# Patient Record
Sex: MALE | Race: WHITE | URBAN - METROPOLITAN AREA
[De-identification: names, ages, dates, MRNs, and addresses within clinical notes are randomized per-mention and may not be internally consistent; named-entity substitution may affect disease eponyms.]

---

## 2020-10-20 ENCOUNTER — OFFICE VISIT (OUTPATIENT)
Dept: URBAN - METROPOLITAN AREA CLINIC 7 | Facility: CLINIC | Age: 75
End: 2020-10-20
Payer: MEDICARE

## 2020-10-20 DIAGNOSIS — H25.12 AGE-RELATED NUCLEAR CATARACT, LEFT EYE: ICD-10-CM

## 2020-10-20 DIAGNOSIS — H43.812 VITREOUS DEGENERATION, LEFT EYE: ICD-10-CM

## 2020-10-20 PROCEDURE — 92134 CPTRZ OPH DX IMG PST SGM RTA: CPT | Performed by: OPHTHALMOLOGY

## 2020-10-20 PROCEDURE — 92014 COMPRE OPH EXAM EST PT 1/>: CPT | Performed by: OPHTHALMOLOGY

## 2020-10-20 ASSESSMENT — INTRAOCULAR PRESSURE
OD: 12
OS: 13

## 2020-10-20 NOTE — IMPRESSION/PLAN
Impression: Exudative age-related macular degeneration, right eye, with inactive scar: H35.0342. Plan: The patient returns for yearly eval and has a subfoveal disciform scar. I recommend observation as there is currently no effective treatment for subretinal scarring from a choroidal neovascular membrane (CNVM). OCT OS today confirms no IRF/SRF. The patient understands that further treatment would only be indicated to prevent enlargement of the central scotoma. I recommend the patient consider low vision aids, and we discussed the importance of yearly dilated eye exams. thanks Marti MAGANA 1 year OCT OU

## 2021-02-16 ENCOUNTER — OFFICE VISIT (OUTPATIENT)
Dept: URBAN - METROPOLITAN AREA CLINIC 7 | Facility: CLINIC | Age: 76
End: 2021-02-16
Payer: MEDICARE

## 2021-02-16 ENCOUNTER — SURGERY (OUTPATIENT)
Dept: URBAN - METROPOLITAN AREA EXTERNAL CLINIC 26 | Facility: EXTERNAL CLINIC | Age: 76
End: 2021-02-16
Payer: MEDICARE

## 2021-02-16 DIAGNOSIS — H35.3211 EXUDATIVE AGE-RELATED MACULAR DEGENERATION, RIGHT EYE, WITH ACTIVE CHOROIDAL NEOVASCULARIZATION: ICD-10-CM

## 2021-02-16 DIAGNOSIS — H35.3213 EXUDATIVE AGE-RELATED MACULAR DEGENERATION, RIGHT EYE, WITH INACTIVE SCAR: ICD-10-CM

## 2021-02-16 PROCEDURE — 67108 REPAIR DETACHED RETINA: CPT | Performed by: OPHTHALMOLOGY

## 2021-02-16 PROCEDURE — 92134 CPTRZ OPH DX IMG PST SGM RTA: CPT | Performed by: OPHTHALMOLOGY

## 2021-02-16 PROCEDURE — 99215 OFFICE O/P EST HI 40 MIN: CPT | Performed by: OPHTHALMOLOGY

## 2021-02-16 RX ORDER — PREDNISOLONE ACETATE 10 MG/ML
1 % SUSPENSION/ DROPS OPHTHALMIC
Qty: 5 | Refills: 3 | Status: INACTIVE
Start: 2021-02-16 | End: 2021-05-04

## 2021-02-16 RX ORDER — OFLOXACIN 3 MG/ML
0.3 % SOLUTION/ DROPS OPHTHALMIC
Qty: 5 | Refills: 3 | Status: INACTIVE
Start: 2021-02-16 | End: 2021-04-20

## 2021-02-16 ASSESSMENT — INTRAOCULAR PRESSURE
OS: 14
OS: 14
OD: 20
OD: 20

## 2021-02-16 NOTE — IMPRESSION/PLAN
Impression: Retinal detachment with single break, right eye: H33.011. 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 We discussed the natural history and the risks and benefit of repairing the RD, which includes laser, pneumatic retinopexy, scleral buckle surgery, and/or vitrectomy surgery. The vision usually improves gradually over a period of several months to 1 year, but usually does not improve to normal. With RD repair, hopefully we can reduce the risk of further visual loss. Risks of surgery include but are not limited to loss of eye, blindness, infection, scar tissue formation, recurrent retinal tears and detachment, glaucoma, change in refractive error, ptosis, need for further surgery, epiretinal membranes, CME and use of gas or silicone oil.
thanks Tam Oliver PLAN: 25G PPV, EL, CRYO, GAS- OD

## 2021-02-16 NOTE — IMPRESSION/PLAN
Impression: Exudative age-related macular degeneration, right eye, with inactive scar: H35.1021. Plan: The patient has a subfoveal disciform scar. I recommend observation as there is currently no effective treatment for subretinal scarring from a choroidal neovascular membrane (CNVM). OCT OD shows subretinal scar and SRF and OS today confirms no IRF/SRF. The patient understands that further treatment would only be indicated to prevent enlargement of the central scotoma. I recommend the patient consider low vision aids, and we discussed the importance of yearly dilated eye exams.

## 2021-02-17 ENCOUNTER — POST-OPERATIVE VISIT (OUTPATIENT)
Dept: URBAN - METROPOLITAN AREA CLINIC 7 | Facility: CLINIC | Age: 76
End: 2021-02-17
Payer: MEDICARE

## 2021-02-17 PROCEDURE — 99024 POSTOP FOLLOW-UP VISIT: CPT | Performed by: OPHTHALMOLOGY

## 2021-02-17 NOTE — IMPRESSION/PLAN
Impression: S/P PPV, EL, CRYO, GAS OD - 1 Day. Retinal detachment with single break, right eye  H33.011. Plan: PF q1-2 hrs Oflox QID. Positioning: sleep on right side. Gas precautions. 

RTC 1 week DFE OD DTG

## 2021-02-25 ENCOUNTER — POST-OPERATIVE VISIT (OUTPATIENT)
Dept: URBAN - METROPOLITAN AREA CLINIC 35 | Facility: CLINIC | Age: 76
End: 2021-02-25
Payer: MEDICARE

## 2021-02-25 DIAGNOSIS — H33.011 RETINAL DETACHMENT WITH SINGLE BREAK, RIGHT EYE: Primary | ICD-10-CM

## 2021-02-25 PROCEDURE — 67515 INJECT/TREAT EYE SOCKET: CPT | Performed by: OPHTHALMOLOGY

## 2021-02-25 PROCEDURE — 99024 POSTOP FOLLOW-UP VISIT: CPT | Performed by: OPHTHALMOLOGY

## 2021-02-25 ASSESSMENT — INTRAOCULAR PRESSURE
OD: 15
OS: 14

## 2021-02-25 NOTE — IMPRESSION/PLAN
Impression: S/P PPV, EL, CRYO, GAS OD - 9 Days. Retinal detachment with single break, right eye  H33.011. Plan: Avoid supine Alt. Prec. 
still with significant inflammation despite PF 3-4x day. We discussed the findings and natural history. REcommend and performed STK injection OD today without complication after discussing the R/B/A in detail. taper off PF. 
will also check ACE, FTA-ABS, CXR, HLA-B27.  
RTC 3 weeks --Taper Prednisolone acetate 1% TID x 2 days, BID x 2 days, QD x 2 days, then d/c--Discontinue Ocuflox

## 2021-03-16 ENCOUNTER — SURGERY (OUTPATIENT)
Dept: URBAN - METROPOLITAN AREA EXTERNAL CLINIC 26 | Facility: EXTERNAL CLINIC | Age: 76
End: 2021-03-16
Payer: MEDICARE

## 2021-03-16 ENCOUNTER — POST-OPERATIVE VISIT (OUTPATIENT)
Dept: URBAN - METROPOLITAN AREA CLINIC 7 | Facility: CLINIC | Age: 76
End: 2021-03-16
Payer: MEDICARE

## 2021-03-16 PROCEDURE — 99024 POSTOP FOLLOW-UP VISIT: CPT | Performed by: OPHTHALMOLOGY

## 2021-03-16 PROCEDURE — 67108 REPAIR DETACHED RETINA: CPT | Performed by: OPHTHALMOLOGY

## 2021-03-16 ASSESSMENT — INTRAOCULAR PRESSURE
OD: 13
OD: 13
OS: 18
OS: 18

## 2021-03-16 NOTE — IMPRESSION/PLAN
Impression: S/P PPV, EL, CRYO, GAS OD - 28 Days. Retinal detachment with single break, right eye  H33.011. Plan: His inflammation is better but still present s/p STK injection. Unfortunately, he has a recurrent ST RD OD. We discussed the fidnigns and natural history. REcommend repeat PPV/CRYO/EL/gas OD and we discussed the R/BI/A in detail and all questions answered. He elects to proceed.  
PLAN: 25g PPV/EL/CRYO/GAS OD

## 2021-03-17 ENCOUNTER — POST-OPERATIVE VISIT (OUTPATIENT)
Dept: URBAN - METROPOLITAN AREA CLINIC 7 | Facility: CLINIC | Age: 76
End: 2021-03-17
Payer: MEDICARE

## 2021-03-17 PROCEDURE — 99024 POSTOP FOLLOW-UP VISIT: CPT | Performed by: OPHTHALMOLOGY

## 2021-03-17 ASSESSMENT — INTRAOCULAR PRESSURE
OS: 10
OD: 8

## 2021-03-17 NOTE — IMPRESSION/PLAN
Impression: S/P 25g, PPV, EL, Cryo, Gas x RD OD - 1 Day. Retinal detachment with single break, right eye  H33.011. Plan: PF/Oflox QID. Gas precautions.  Right side down per DTG

RTC 1 week DFE OD DTG

## 2021-03-24 ENCOUNTER — POST-OPERATIVE VISIT (OUTPATIENT)
Dept: URBAN - METROPOLITAN AREA CLINIC 7 | Facility: CLINIC | Age: 76
End: 2021-03-24
Payer: MEDICARE

## 2021-03-24 DIAGNOSIS — Z48.810 ENCOUNTER FOR SURGICAL AFTERCARE FOLLOWING SURGERY ON THE SENSE ORGANS: ICD-10-CM

## 2021-03-24 PROCEDURE — 99024 POSTOP FOLLOW-UP VISIT: CPT | Performed by: OPHTHALMOLOGY

## 2021-03-24 ASSESSMENT — INTRAOCULAR PRESSURE
OD: 14
OS: 12

## 2021-03-24 NOTE — IMPRESSION/PLAN
Impression: S/P 25g, PPV, EL, Cryo, Gas x RD OD - 8 Days. Retinal detachment with single break, right eye  H33.011. Plan: Taper drops. Gas precautions. Sleep on side.  

RTC 3-4 weeks DFE OD OCT OD

## 2021-04-20 ENCOUNTER — POST-OPERATIVE VISIT (OUTPATIENT)
Dept: URBAN - METROPOLITAN AREA CLINIC 7 | Facility: CLINIC | Age: 76
End: 2021-04-20
Payer: MEDICARE

## 2021-04-20 PROCEDURE — 92235 FLUORESCEIN ANGRPH MLTIFRAME: CPT | Performed by: OPHTHALMOLOGY

## 2021-04-20 PROCEDURE — 92134 CPTRZ OPH DX IMG PST SGM RTA: CPT | Performed by: OPHTHALMOLOGY

## 2021-04-20 RX ORDER — VALACYCLOVIR HYDROCHLORIDE 1 G/1
TABLET, FILM COATED ORAL
Qty: 50 | Refills: 2 | Status: INACTIVE
Start: 2021-04-20 | End: 2021-09-15

## 2021-04-20 ASSESSMENT — INTRAOCULAR PRESSURE
OS: 15
OD: 20

## 2021-04-20 NOTE — IMPRESSION/PLAN
Impression: S/P 25g, PPV, EL, Cryo, Gas x RD OD - 35 Days. Retinal detachment with single break, right eye  H33.011. Plan: retina attached Alt. Prec.
still with persistent inflammation and now with retinal whitening in the temporal macula and periphery. OCT shows attached retina OU
FA shows: macular scarring and no peripheral views. TP-PA, ACE, HLA-B27 are negative S/P STK injection 2/25/21
start valtrex 2gm TID x 2 weeks then 1gm TID
RTC 2 weeks or sooner PRN

## 2021-05-04 ENCOUNTER — POST-OPERATIVE VISIT (OUTPATIENT)
Dept: URBAN - METROPOLITAN AREA CLINIC 7 | Facility: CLINIC | Age: 76
End: 2021-05-04
Payer: MEDICARE

## 2021-05-04 PROCEDURE — 99024 POSTOP FOLLOW-UP VISIT: CPT | Performed by: OPHTHALMOLOGY

## 2021-05-04 ASSESSMENT — INTRAOCULAR PRESSURE
OD: 10
OS: 9

## 2021-05-04 NOTE — IMPRESSION/PLAN
Impression: S/P 25g, PPV, EL, Cryo, Gas x RD OD - 49 Days. Retinal detachment with single break, right eye  H33.011. Plan: still with pigmented KP and temporal retinal whitening posteriorly and peripherally. He has not  been on the correct dose of valtrex. FA 4/20/21 shows: macular scarring and no peripheral views. TP-PA, ACE, HLA-B27 are negative S/P STK injection 2/25/21
start valtrex 2gm TID x 2 weeks then 1gm TID I recommend 2nd opinion.  
RTC 2 weeks OCT OU with NVP

## 2021-05-19 ENCOUNTER — POST-OPERATIVE VISIT (OUTPATIENT)
Dept: URBAN - METROPOLITAN AREA CLINIC 7 | Facility: CLINIC | Age: 76
End: 2021-05-19
Payer: MEDICARE

## 2021-05-19 PROCEDURE — 99024 POSTOP FOLLOW-UP VISIT: CPT | Performed by: OPHTHALMOLOGY

## 2021-05-19 RX ORDER — PREDNISONE 20 MG/1
20 MG TABLET ORAL
Qty: 60 | Refills: 0 | Status: INACTIVE
Start: 2021-05-19 | End: 2021-06-17

## 2021-05-19 ASSESSMENT — INTRAOCULAR PRESSURE
OD: 14
OS: 11

## 2021-06-01 ENCOUNTER — POST-OPERATIVE VISIT (OUTPATIENT)
Dept: URBAN - METROPOLITAN AREA CLINIC 13 | Facility: CLINIC | Age: 76
End: 2021-06-01
Payer: MEDICARE

## 2021-06-01 PROCEDURE — 99024 POSTOP FOLLOW-UP VISIT: CPT | Performed by: OPHTHALMOLOGY

## 2021-06-01 PROCEDURE — 92235 FLUORESCEIN ANGRPH MLTIFRAME: CPT | Performed by: OPHTHALMOLOGY

## 2021-06-01 ASSESSMENT — INTRAOCULAR PRESSURE
OD: 22
OS: 13

## 2021-06-01 NOTE — IMPRESSION/PLAN
Impression: S/P 25g, PPV, EL, Cryo, Gas x RD OD - 77 Days. Retinal detachment with single break, right eye  H33.011. OCT:
OD: scar OS: wnl FA: 
Staining of macular scar; no real disc leakage or vascular leakage. NO active inflammation noted Plan: Exam does not show any real active inflammation. Has persistent pigmented KP- but these are longstanding. Pt has not had significant change with Prednisone trial. Imaging does not show a lot of active inflammation. I would recommend tapering Prednisone at this time. 40mg x 1 week 20mg x 1 week 10mg x 1 week then stop. RTC 1 month DFE OD OCT OD --Advised patient to use artificial tears for comfort.

## 2021-06-18 ENCOUNTER — HOSPITAL ENCOUNTER (OUTPATIENT)
Dept: LAB | Facility: CLINIC | Age: 76
Discharge: HOME | End: 2021-06-18
Attending: OPHTHALMOLOGY | Admitting: OPHTHALMOLOGY
Payer: MEDICARE

## 2021-06-18 DIAGNOSIS — H30.131 ACUTE RETINAL NECROSIS OF RIGHT EYE: Primary | ICD-10-CM

## 2021-06-18 LAB
BASOPHILS # BLD AUTO: 0 10E9/L (ref 0–0.2)
BASOPHILS NFR BLD AUTO: 0.5 %
DIFFERENTIAL METHOD BLD: NORMAL
EOSINOPHIL # BLD AUTO: 0 10E9/L (ref 0–0.7)
EOSINOPHIL NFR BLD AUTO: 0.4 %
ERYTHROCYTE [DISTWIDTH] IN BLOOD BY AUTOMATED COUNT: 13.4 % (ref 10–15)
ERYTHROCYTE [SEDIMENTATION RATE] IN BLOOD BY WESTERGREN METHOD: 5 MM/H (ref 0–20)
HCT VFR BLD AUTO: 46.2 % (ref 40–53)
HGB BLD-MCNC: 15.6 G/DL (ref 13.3–17.7)
IMM GRANULOCYTES # BLD: 0.1 10E9/L (ref 0–0.4)
IMM GRANULOCYTES NFR BLD: 0.7 %
LYMPHOCYTES # BLD AUTO: 0.8 10E9/L (ref 0.8–5.3)
LYMPHOCYTES NFR BLD AUTO: 10.5 %
MCH RBC QN AUTO: 32 PG (ref 26.5–33)
MCHC RBC AUTO-ENTMCNC: 33.8 G/DL (ref 31.5–36.5)
MCV RBC AUTO: 95 FL (ref 78–100)
MISCELLANEOUS TEST: NORMAL
MONOCYTES # BLD AUTO: 0.4 10E9/L (ref 0–1.3)
MONOCYTES NFR BLD AUTO: 5.8 %
NEUTROPHILS # BLD AUTO: 6.3 10E9/L (ref 1.6–8.3)
NEUTROPHILS NFR BLD AUTO: 82.1 %
NRBC # BLD AUTO: 0 10*3/UL
NRBC BLD AUTO-RTO: 0 /100
PLATELET # BLD AUTO: 180 10E9/L (ref 150–450)
RBC # BLD AUTO: 4.88 10E12/L (ref 4.4–5.9)
T PALLIDUM AB SER QL: NONREACTIVE
VZV IGG SER QL IA: 2.5 AI (ref 0–0.8)
WBC # BLD AUTO: 7.6 10E9/L (ref 4–11)

## 2021-06-18 PROCEDURE — 86694 HERPES SIMPLEX NES ANTBDY: CPT | Performed by: OPHTHALMOLOGY

## 2021-06-18 PROCEDURE — 86777 TOXOPLASMA ANTIBODY: CPT | Performed by: OPHTHALMOLOGY

## 2021-06-18 PROCEDURE — 86787 VARICELLA-ZOSTER ANTIBODY: CPT | Mod: 59 | Performed by: OPHTHALMOLOGY

## 2021-06-18 PROCEDURE — 36415 COLL VENOUS BLD VENIPUNCTURE: CPT | Performed by: OPHTHALMOLOGY

## 2021-06-18 PROCEDURE — 85652 RBC SED RATE AUTOMATED: CPT | Performed by: OPHTHALMOLOGY

## 2021-06-18 PROCEDURE — 86481 TB AG RESPONSE T-CELL SUSP: CPT | Performed by: OPHTHALMOLOGY

## 2021-06-18 PROCEDURE — 85025 COMPLETE CBC W/AUTO DIFF WBC: CPT | Performed by: OPHTHALMOLOGY

## 2021-06-18 PROCEDURE — 84999 UNLISTED CHEMISTRY PROCEDURE: CPT | Performed by: OPHTHALMOLOGY

## 2021-06-18 PROCEDURE — 86611 BARTONELLA ANTIBODY: CPT | Mod: 91 | Performed by: OPHTHALMOLOGY

## 2021-06-18 PROCEDURE — 86787 VARICELLA-ZOSTER ANTIBODY: CPT | Performed by: OPHTHALMOLOGY

## 2021-06-18 PROCEDURE — 86780 TREPONEMA PALLIDUM: CPT | Mod: GZ | Performed by: OPHTHALMOLOGY

## 2021-06-18 PROCEDURE — 86778 TOXOPLASMA ANTIBODY IGM: CPT | Performed by: OPHTHALMOLOGY

## 2021-06-19 LAB
GAMMA INTERFERON BACKGROUND BLD IA-ACNC: 0 IU/ML
M TB IFN-G CD4+ BCKGRND COR BLD-ACNC: 8.27 IU/ML
M TB TUBERC IFN-G BLD QL: NEGATIVE
MITOGEN IGNF BCKGRD COR BLD-ACNC: 0 IU/ML
MITOGEN IGNF BCKGRD COR BLD-ACNC: 0.02 IU/ML
T GONDII IGG SER-ACNC: 209 IU/ML
T GONDII IGM SER-ACNC: <3 AU/ML

## 2021-06-20 LAB — VZV IGM SER IA-ACNC: 0.07 ISR

## 2021-06-21 LAB
RESULT: NORMAL
SEND OUTS MISC TEST CODE: NORMAL
SEND OUTS MISC TEST SPECIMEN: NORMAL
TEST NAME: NORMAL

## 2021-06-22 LAB
B HENSELAE IGG TITR SER IF: NORMAL {TITER}
B HENSELAE IGM TITR SER IF: NORMAL {TITER}
B QUINTANA IGG TITR SER: NORMAL {TITER}
B QUINTANA IGM TITR SER: NORMAL {TITER}

## 2021-07-14 ENCOUNTER — OFFICE VISIT (OUTPATIENT)
Dept: URBAN - METROPOLITAN AREA CLINIC 7 | Facility: CLINIC | Age: 76
End: 2021-07-14
Payer: MEDICARE

## 2021-07-14 PROCEDURE — 92134 CPTRZ OPH DX IMG PST SGM RTA: CPT | Performed by: OPHTHALMOLOGY

## 2021-07-14 PROCEDURE — 99024 POSTOP FOLLOW-UP VISIT: CPT | Performed by: OPHTHALMOLOGY

## 2021-07-14 ASSESSMENT — INTRAOCULAR PRESSURE
OS: 14
OD: 10

## 2021-07-14 NOTE — IMPRESSION/PLAN
Impression: S/P 25g, PPV, EL, Cryo, Gas x RD OD - 77 Days. Retinal detachment with single break, right eye  H33.011. OCT:
OD: scar OS: wnl FA: 
Staining of macular scar; no real disc leakage or vascular leakage. NO active inflammation noted Plan: Pt had interesting post operative course with atypical inflammation and temporal retinal plaque. Had Prednisone trial without significant change. FA did not show any substantial leakage. Has history of AMD with central macular scar. Pt was seen in [place] and had work up which was largely negative except for Toxo IgG (IgM negative- suggesting remote infection). Has been on Prednisone 20mg, Valtrex 1g TID, and Bactrim DS BID since being seen in [place]. Fundus exam shows possible contraction of retinal plaque with more atrophic changes. 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 Discussed with patient possibility of retinal biopsy in future. Rec obtaining MRI Brain and Orbits w/ and w/out contrast to rule out CNS lymphoma first. 

Pt to follow up in [place]. He will get MRI done before he leaves.

## 2021-09-15 ENCOUNTER — OFFICE VISIT (OUTPATIENT)
Dept: URBAN - METROPOLITAN AREA CLINIC 7 | Facility: CLINIC | Age: 76
End: 2021-09-15
Payer: MEDICARE

## 2021-09-15 PROCEDURE — 99213 OFFICE O/P EST LOW 20 MIN: CPT | Performed by: OPHTHALMOLOGY

## 2021-09-15 PROCEDURE — 92134 CPTRZ OPH DX IMG PST SGM RTA: CPT | Performed by: OPHTHALMOLOGY

## 2021-09-15 ASSESSMENT — INTRAOCULAR PRESSURE
OD: 10
OS: 13

## 2021-09-15 NOTE — IMPRESSION/PLAN
Impression: S/P 25g, PPV, EL, Cryo, Gas x RD OD s. Retinal detachment with single break, right eye  H33.011. OCT: 09/15/21 OD: scar OS: wnl FA: 
Staining of macular scar; no real disc leakage or vascular leakage. NO active inflammation noted Plan: Pt had interesting post operative course with atypical inflammation and temporal retinal plaque. Had Prednisone trial without significant change. FA did not show any substantial leakage. Has history of AMD with central macular scar. Pt was seen in [place] and had work up which was largely negative except for Toxo IgG (IgM negative- suggesting remote infection). S/p Foscarnet injection, Bactrim DS, and Prednisone. Viral PCR negative. Now off all meds. There was concern for possible lymphoma, however, temporal lesion now appears flat and atrophic. I would recommend observation at this time and consider further workup if lesion changes over time. MRI was normal. No evidence of CNS lymphoma. 

RTC 3 months DFE OU OCT OU

## 2021-12-15 ENCOUNTER — OFFICE VISIT (OUTPATIENT)
Dept: URBAN - METROPOLITAN AREA CLINIC 7 | Facility: CLINIC | Age: 76
End: 2021-12-15
Payer: MEDICARE

## 2021-12-15 PROCEDURE — 92134 CPTRZ OPH DX IMG PST SGM RTA: CPT | Performed by: OPHTHALMOLOGY

## 2021-12-15 PROCEDURE — 99213 OFFICE O/P EST LOW 20 MIN: CPT | Performed by: OPHTHALMOLOGY

## 2021-12-15 ASSESSMENT — INTRAOCULAR PRESSURE
OS: 11
OD: 9

## 2021-12-15 NOTE — IMPRESSION/PLAN
Impression: S/P 25g, PPV, EL, Cryo, Gas x RD OD s. Retinal detachment with single break, right eye  H33.011. OCT: 12/15/21 OD: scar OS: wnl FA: 
Staining of macular scar; no real disc leakage or vascular leakage. NO active inflammation noted Plan: Pt had interesting post operative course with atypical inflammation and temporal retinal plaque. Had Prednisone trial without significant change. FA did not show any substantial leakage. Has history of AMD with central macular scar. Pt was seen in [place] and had work up which was largely negative except for Toxo IgG (IgM negative- suggesting remote infection). S/p Foscarnet injection, Bactrim DS, and Prednisone. Viral PCR negative. Now off all meds. There was concern for possible lymphoma, however, temporal lesion now appears flat and atrophic. I would recommend observation at this time and consider further workup if lesion changes over time. MRI was normal. No evidence of CNS lymphoma. 

RTC 3 months DFE OU OCT OU

## 2022-03-16 ENCOUNTER — OFFICE VISIT (OUTPATIENT)
Dept: URBAN - METROPOLITAN AREA CLINIC 7 | Facility: CLINIC | Age: 77
End: 2022-03-16
Payer: MEDICARE

## 2022-03-16 DIAGNOSIS — H26.491 OTHER SECONDARY CATARACT, RIGHT EYE: ICD-10-CM

## 2022-03-16 PROCEDURE — 92134 CPTRZ OPH DX IMG PST SGM RTA: CPT | Performed by: OPHTHALMOLOGY

## 2022-03-16 PROCEDURE — 99213 OFFICE O/P EST LOW 20 MIN: CPT | Performed by: OPHTHALMOLOGY

## 2022-03-16 ASSESSMENT — INTRAOCULAR PRESSURE
OD: 8
OS: 9

## 2022-03-16 NOTE — IMPRESSION/PLAN
Impression: S/P 25g, PPV, EL, Cryo, Gas x RD OD s. Retinal detachment with single break, right eye  H33.011. OCT: 03/16/2022 OD: scar OS: wnl FA: 
Staining of macular scar; no real disc leakage or vascular leakage. NO active inflammation noted Plan: Pt had interesting post operative course with atypical inflammation and temporal retinal plaque. Had Prednisone trial without significant change. FA did not show any substantial leakage. Has history of AMD with central macular scar. Pt was seen in [place] and had work up which was largely negative except for Toxo IgG (IgM negative- suggesting remote infection). S/p Foscarnet injection, Bactrim DS, and Prednisone. Viral PCR negative. Now off all meds. There was concern for possible lymphoma, however, temporal lesion now appears flat and atrophic. I would recommend observation at this time and consider further workup if lesion changes over time. MRI was normal. No evidence of CNS lymphoma. 

RTC 3 months DFE OU OCT OU

## 2022-03-16 NOTE — IMPRESSION/PLAN
Impression: Other secondary cataract, right eye: H26.491. Plan: Refer for YAG eval if pt is interested. Discussed low risk but I do not think it would make a huge difference in vision.

## 2022-06-15 ENCOUNTER — OFFICE VISIT (OUTPATIENT)
Dept: URBAN - METROPOLITAN AREA CLINIC 7 | Facility: CLINIC | Age: 77
End: 2022-06-15
Payer: MEDICARE

## 2022-06-15 DIAGNOSIS — H26.491 OTHER SECONDARY CATARACT, RIGHT EYE: ICD-10-CM

## 2022-06-15 DIAGNOSIS — H59.811 CHORIORETINAL SCAR AFTER SURGERY FOR DETACHMENT OF RIGHT EYE: Primary | ICD-10-CM

## 2022-06-15 PROCEDURE — 99214 OFFICE O/P EST MOD 30 MIN: CPT | Performed by: OPHTHALMOLOGY

## 2022-06-15 PROCEDURE — 92134 CPTRZ OPH DX IMG PST SGM RTA: CPT | Performed by: OPHTHALMOLOGY

## 2022-06-15 ASSESSMENT — INTRAOCULAR PRESSURE
OS: 10
OD: 3

## 2022-06-15 NOTE — IMPRESSION/PLAN
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 S/P 25g, PPV, EL, Cryo, Gas x RD OD

OCT: 06/15/22 OD: scar OS: wnl FA: 
Staining of macular scar; no real disc leakage or vascular leakage. NO active inflammation noted Plan: Pt had interesting post operative course with atypical inflammation and temporal retinal plaque. Had Prednisone trial without significant change. FA did not show any substantial leakage. Has history of AMD with central macular scar. Pt was seen in [place] and had work up which was largely negative except for Toxo IgG (IgM negative- suggesting remote infection). S/p Foscarnet injection, Bactrim DS, and Prednisone. Viral PCR negative. Now off all meds. There was concern for possible lymphoma, however, temporal lesion now appears flat and atrophic. I would recommend observation at this time and consider further workup if lesion changes over time. MRI was normal. No evidence of CNS lymphoma.

## 2022-06-15 NOTE — IMPRESSION/PLAN
Impression: Other secondary cataract, right eye: H26.491. Plan: s/p YAG. Has some diffuse IOL deposits. ? Calcific. Will start PF TID OD to see if it has any effect.  Limited visual potential. 

RTC 6-7 weeks DFE OD

## 2022-08-03 ENCOUNTER — OFFICE VISIT (OUTPATIENT)
Dept: URBAN - METROPOLITAN AREA CLINIC 7 | Facility: CLINIC | Age: 77
End: 2022-08-03
Payer: MEDICARE

## 2022-08-03 DIAGNOSIS — H26.491 OTHER SECONDARY CATARACT, RIGHT EYE: ICD-10-CM

## 2022-08-03 DIAGNOSIS — H59.811 CHORIORETINAL SCAR AFTER SURGERY FOR DETACHMENT OF RIGHT EYE: Primary | ICD-10-CM

## 2022-08-03 PROCEDURE — 92134 CPTRZ OPH DX IMG PST SGM RTA: CPT | Performed by: OPHTHALMOLOGY

## 2022-08-03 PROCEDURE — 99213 OFFICE O/P EST LOW 20 MIN: CPT | Performed by: OPHTHALMOLOGY

## 2022-08-03 ASSESSMENT — INTRAOCULAR PRESSURE
OD: 8
OS: 12

## 2022-08-03 NOTE — IMPRESSION/PLAN
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 S/P 25g, PPV, EL, Cryo, Gas x RD OD

OCT: 8/3/22 OD: scar OS: wnl FA: 
Staining of macular scar; no real disc leakage or vascular leakage. NO active inflammation noted Plan: Pt had interesting post operative course with atypical inflammation and temporal retinal plaque. Had Prednisone trial without significant change. FA did not show any substantial leakage. Has history of AMD with central macular scar. Pt was seen in [place] and had work up which was largely negative except for Toxo IgG (IgM negative- suggesting remote infection). S/p Foscarnet injection, Bactrim DS, and Prednisone. Viral PCR negative. Now off all meds. There was concern for possible lymphoma, however, temporal lesion now appears flat and atrophic. I would recommend observation at this time and consider further workup if lesion changes over time. MRI was normal. No evidence of CNS lymphoma.

## 2022-08-03 NOTE — IMPRESSION/PLAN
Impression: Other secondary cataract, right eye: H26.491. Plan: s/p YAG. Has some diffuse IOL deposits. May be secondary to YAG laser. Pt has been using PF inconsistently. No real inflammation today. Rec observation without continuing PF. 

RTC 3 months DFE OU OCT OU

## 2023-09-13 ENCOUNTER — OFFICE VISIT (OUTPATIENT)
Dept: URBAN - METROPOLITAN AREA CLINIC 7 | Facility: CLINIC | Age: 78
End: 2023-09-13
Payer: MEDICARE

## 2023-09-13 DIAGNOSIS — H59.811 CHORIORETINAL SCAR AFTER SURGERY FOR DETACHMENT OF RIGHT EYE: Primary | ICD-10-CM

## 2023-09-13 DIAGNOSIS — H26.491 OTHER SECONDARY CATARACT, RIGHT EYE: ICD-10-CM

## 2023-09-13 PROCEDURE — 92134 CPTRZ OPH DX IMG PST SGM RTA: CPT | Performed by: OPHTHALMOLOGY

## 2023-09-13 PROCEDURE — 99213 OFFICE O/P EST LOW 20 MIN: CPT | Performed by: OPHTHALMOLOGY

## 2023-09-13 ASSESSMENT — INTRAOCULAR PRESSURE: OS: 12

## 2023-09-22 ENCOUNTER — OFFICE VISIT (OUTPATIENT)
Dept: URBAN - METROPOLITAN AREA CLINIC 10 | Facility: CLINIC | Age: 78
End: 2023-09-22
Payer: MEDICARE

## 2023-09-22 DIAGNOSIS — H04.123 DRY EYE SYNDROME OF BILATERAL LACRIMAL GLANDS: ICD-10-CM

## 2023-09-22 DIAGNOSIS — H59.811 CHORIORETINAL SCAR AFTER SURGERY FOR DETACHMENT OF RIGHT EYE: Primary | ICD-10-CM

## 2023-09-22 DIAGNOSIS — H43.812 VITREOUS DEGENERATION, LEFT EYE: ICD-10-CM

## 2023-09-22 DIAGNOSIS — H25.812 COMBINED FORMS OF AGE-RELATED CATARACT, LEFT EYE: ICD-10-CM

## 2023-09-22 PROCEDURE — 92134 CPTRZ OPH DX IMG PST SGM RTA: CPT | Performed by: OPTOMETRIST

## 2023-09-22 PROCEDURE — 99204 OFFICE O/P NEW MOD 45 MIN: CPT | Performed by: OPTOMETRIST

## 2023-09-22 ASSESSMENT — INTRAOCULAR PRESSURE
OD: 3
OS: 10

## 2023-09-22 ASSESSMENT — VISUAL ACUITY
OD: HM
OS: 20/20

## 2024-11-04 ENCOUNTER — OFFICE VISIT (OUTPATIENT)
Dept: URBAN - METROPOLITAN AREA CLINIC 7 | Facility: CLINIC | Age: 79
End: 2024-11-04
Payer: MEDICARE

## 2024-11-04 DIAGNOSIS — H26.491 OTHER SECONDARY CATARACT, RIGHT EYE: ICD-10-CM

## 2024-11-04 DIAGNOSIS — H59.811 CHORIORETINAL SCARS AFTER SURGERY FOR DETACHMENT, RIGHT EYE: Primary | ICD-10-CM

## 2024-11-04 PROCEDURE — 92014 COMPRE OPH EXAM EST PT 1/>: CPT | Performed by: OPHTHALMOLOGY

## 2024-11-04 PROCEDURE — 92134 CPTRZ OPH DX IMG PST SGM RTA: CPT | Performed by: OPHTHALMOLOGY

## 2024-11-04 ASSESSMENT — INTRAOCULAR PRESSURE
OS: 10
OD: 5